# Patient Record
Sex: MALE | Race: WHITE
[De-identification: names, ages, dates, MRNs, and addresses within clinical notes are randomized per-mention and may not be internally consistent; named-entity substitution may affect disease eponyms.]

---

## 2023-09-01 ENCOUNTER — HOSPITAL ENCOUNTER (OUTPATIENT)
Dept: HOSPITAL 76 - DI | Age: 73
Discharge: HOME | End: 2023-09-01
Attending: INTERNAL MEDICINE
Payer: COMMERCIAL

## 2023-09-01 DIAGNOSIS — M47.816: Primary | ICD-10-CM

## 2023-09-01 NOTE — XRAY REPORT
PROCEDURE:  Lumbar Spine 2 View

 

INDICATIONS:  LOW BACK PAIN,UNSPECIFIED

 

TECHNIQUE:  3 views of the lumbar spine were acquired.  

 

COMPARISON:  12/7/2016 and.

 

FINDINGS:  

 

Bones:  5 non-rib-bearing vertebrae are present.  No significant change. Trace retrolisthesis of L3 o
n L4. Lower lumbar facet arthropathy. No vertebral body compression fractures.  No suspicious bony le
sions.  Lower lumbar facet arthropathy and degenerative disc space loss.

 

Soft tissues:  Overlying bowel gas pattern is normal.  No suspicious soft tissue calcifications.  

 

 

IMPRESSION:  No acute bony abnormality. Lower lumbar facet arthropathy.

 

Comment: Lumbar spine MRI may be helpful.

 

Reviewed by: Berto Tejeda MD on 9/1/2023 12:59 PM PDT

Approved by: Berto Tejeda MD on 9/1/2023 12:59 PM PDT

 

 

Station ID:  [id number]